# Patient Record
Sex: MALE | Race: BLACK OR AFRICAN AMERICAN | Employment: UNEMPLOYED | URBAN - METROPOLITAN AREA
[De-identification: names, ages, dates, MRNs, and addresses within clinical notes are randomized per-mention and may not be internally consistent; named-entity substitution may affect disease eponyms.]

---

## 2017-08-06 ENCOUNTER — HOSPITAL ENCOUNTER (EMERGENCY)
Age: 27
Discharge: HOME | End: 2017-08-06
Attending: STUDENT IN AN ORGANIZED HEALTH CARE EDUCATION/TRAINING PROGRAM
Payer: SELF-PAY

## 2017-08-06 VITALS
SYSTOLIC BLOOD PRESSURE: 132 MMHG | HEIGHT: 68 IN | RESPIRATION RATE: 16 BRPM | TEMPERATURE: 99.2 F | DIASTOLIC BLOOD PRESSURE: 77 MMHG | BODY MASS INDEX: 21.22 KG/M2 | OXYGEN SATURATION: 99 % | HEART RATE: 71 BPM | WEIGHT: 140 LBS

## 2017-08-06 DIAGNOSIS — Z20.2 STD EXPOSURE: Primary | ICD-10-CM

## 2017-08-06 LAB
APPEARANCE UR: ABNORMAL
BACTERIA URNS QL MICRO: ABNORMAL /HPF
BILIRUB UR QL: NEGATIVE
COLOR UR: ABNORMAL
EPITH CASTS URNS QL MICRO: ABNORMAL /LPF
GLUCOSE UR STRIP.AUTO-MCNC: NEGATIVE MG/DL
HGB UR QL STRIP: NEGATIVE
KETONES UR QL STRIP.AUTO: NEGATIVE MG/DL
LEUKOCYTE ESTERASE UR QL STRIP.AUTO: ABNORMAL
MUCOUS THREADS URNS QL MICRO: ABNORMAL /LPF
NITRITE UR QL STRIP.AUTO: NEGATIVE
PH UR STRIP: 6 [PH] (ref 5–8)
PROT UR STRIP-MCNC: ABNORMAL MG/DL
RBC #/AREA URNS HPF: ABNORMAL /HPF (ref 0–5)
SP GR UR REFRACTOMETRY: 1.02 (ref 1–1.03)
UROBILINOGEN UR QL STRIP.AUTO: 1 EU/DL (ref 0.2–1)
WBC URNS QL MICRO: ABNORMAL /HPF (ref 0–4)

## 2017-08-06 PROCEDURE — 87491 CHLMYD TRACH DNA AMP PROBE: CPT | Performed by: NURSE PRACTITIONER

## 2017-08-06 PROCEDURE — 74011000250 HC RX REV CODE- 250: Performed by: NURSE PRACTITIONER

## 2017-08-06 PROCEDURE — 99283 EMERGENCY DEPT VISIT LOW MDM: CPT

## 2017-08-06 PROCEDURE — 74011250637 HC RX REV CODE- 250/637: Performed by: NURSE PRACTITIONER

## 2017-08-06 PROCEDURE — 96372 THER/PROPH/DIAG INJ SC/IM: CPT

## 2017-08-06 PROCEDURE — 81001 URINALYSIS AUTO W/SCOPE: CPT | Performed by: NURSE PRACTITIONER

## 2017-08-06 PROCEDURE — 74011250636 HC RX REV CODE- 250/636: Performed by: NURSE PRACTITIONER

## 2017-08-06 RX ORDER — AZITHROMYCIN 250 MG/1
1000 TABLET, FILM COATED ORAL
Status: COMPLETED | OUTPATIENT
Start: 2017-08-06 | End: 2017-08-06

## 2017-08-06 RX ADMIN — AZITHROMYCIN 1000 MG: 250 TABLET, FILM COATED ORAL at 12:56

## 2017-08-06 RX ADMIN — LIDOCAINE HYDROCHLORIDE 250 MG: 10 INJECTION, SOLUTION EPIDURAL; INFILTRATION; INTRACAUDAL; PERINEURAL at 12:56

## 2017-08-06 NOTE — ED PROVIDER NOTES
HPI Comments: The pt is a 24year old male who presents ambulatory to the ED with request to be treated for chlamydia because his sexual partner tested positive on Friday. He denies penile pain and drainage. Pt denies fevers, chills, night sweats, chest pain, pressure, SOB, abdominal pain, n/v/d, melena, hematuria, dysuria, constipation, HA, dizziness, and syncope. History reviewed. No pertinent past medical history. History reviewed. No pertinent surgical history. Patient is a 24 y.o. male presenting with STD exposure. The history is provided by the patient. Exposure to STD   Pertinent negatives include no nausea, no vomiting and no abdominal pain. History reviewed. No pertinent past medical history. History reviewed. No pertinent surgical history. History reviewed. No pertinent family history. Social History     Social History    Marital status: SINGLE     Spouse name: N/A    Number of children: N/A    Years of education: N/A     Occupational History    Not on file. Social History Main Topics    Smoking status: Never Smoker    Smokeless tobacco: Never Used    Alcohol use No    Drug use: Not on file    Sexual activity: Not on file     Other Topics Concern    Not on file     Social History Narrative         ALLERGIES: Review of patient's allergies indicates no known allergies. Review of Systems   Constitutional: Negative for activity change, appetite change, chills, diaphoresis, fatigue, fever and unexpected weight change. HENT: Negative for congestion, ear pain, rhinorrhea, sinus pressure, sore throat, tinnitus, trouble swallowing and voice change. Eyes: Negative for pain, discharge, redness and visual disturbance. Respiratory: Negative for apnea, cough, choking, chest tightness, shortness of breath, wheezing and stridor. Cardiovascular: Negative for chest pain, palpitations and leg swelling.    Gastrointestinal: Negative for abdominal pain, constipation, nausea and vomiting. Endocrine: Negative for cold intolerance and heat intolerance. Genitourinary: Negative for difficulty urinating, dysuria, flank pain, hematuria, testicular pain and urgency. Musculoskeletal: Negative for arthralgias, back pain, gait problem, joint swelling, myalgias, neck pain and neck stiffness. Skin: Negative for color change, pallor, rash and wound. Allergic/Immunologic: Negative for immunocompromised state. Neurological: Negative for dizziness, tremors, syncope, weakness, light-headedness, numbness and headaches. Hematological: Does not bruise/bleed easily. Psychiatric/Behavioral: Negative for agitation, confusion and suicidal ideas. Vitals:    08/06/17 1211   BP: 132/77   Pulse: 71   Resp: 16   Temp: 99.2 °F (37.3 °C)   SpO2: 99%   Weight: 63.5 kg (140 lb)   Height: 5' 8\" (1.727 m)            Physical Exam   Constitutional: He is oriented to person, place, and time. He appears well-developed and well-nourished. No distress. HENT:   Head: Atraumatic. Nose: Nose normal.   Mouth/Throat: No oropharyngeal exudate. Eyes: Conjunctivae and EOM are normal. Right eye exhibits no discharge. Left eye exhibits no discharge. No scleral icterus. Neck: Normal range of motion. Neck supple. No JVD present. No tracheal deviation present. No thyromegaly present. Cardiovascular: Normal rate and regular rhythm. Exam reveals no gallop and no friction rub. No murmur heard. Pulmonary/Chest: Breath sounds normal. No stridor. No respiratory distress. He has no wheezes. He has no rales. He exhibits no tenderness. Abdominal: Soft. Bowel sounds are normal. He exhibits no distension and no mass. There is no tenderness. There is no rebound and no guarding. Musculoskeletal: Normal range of motion. He exhibits no edema or tenderness. Lymphadenopathy:     He has no cervical adenopathy. Neurological: He is alert and oriented to person, place, and time.  Coordination normal.   Skin: Skin is warm and dry. He is not diaphoretic. Psychiatric: He has a normal mood and affect. His behavior is normal.   Nursing note and vitals reviewed. MDM  Number of Diagnoses or Management Options  Diagnosis management comments:    * UA   * GC/chla    * rocephin and azithromycin        Amount and/or Complexity of Data Reviewed  Clinical lab tests: ordered and reviewed    Risk of Complications, Morbidity, and/or Mortality  General comments:    - stable, ambulatory pt in NAD    Patient Progress  Patient progress: stable    ED Course       Procedures      1:06 PM  Pt has been reevaluated. There are no new complaints, changes, or physical findings at this time. Medications have been reviewed w/ pt and/or family. Pt and/or family's questions have been answered. Pt and/or family expressed good understanding of the dx/tx/rx and is in agreement with plan of care. Pt instructed and agreed to f/u w/ PCP and to return to ED upon further deterioration. Pt is ready for discharge. LABORATORY TESTS:  No results found for this or any previous visit (from the past 12 hour(s)). IMAGING RESULTS:  No orders to display     No results found. MEDICATIONS GIVEN:  Medications   cefTRIAXone (ROCEPHIN) 250 mg in lidocaine (PF) (XYLOCAINE) 10 mg/mL (1 %) IM injection (250 mg IntraMUSCular Given 8/6/17 1256)   azithromycin (ZITHROMAX) tablet 1,000 mg (1,000 mg Oral Given 8/6/17 1256)       IMPRESSION:  1. STD exposure        PLAN:  1. There are no discharge medications for this patient. 2.   Follow-up Information     Follow up With Details Comments [address]  In 1 week  [address] [facility] [place] [facility]  As needed, If symptoms worsen [address]  [phone]        3.  Supportive care     Return to ED if worse         Radha Diaz NP   1:06 PM

## 2017-08-06 NOTE — DISCHARGE INSTRUCTIONS
Learning How to Use a Male Condom  What is a male condom? Condoms can be used to prevent pregnancy. They can also help protect against sexually transmitted infections (STIs). You must use a new condom every time you have sex. Condoms prevent pregnancy by keeping sperm and eggs apart. The condom holds the sperm so the sperm can't get into the vagina. A male condom is a tube of soft rubber or plastic with a closed end. It fits over the penis. There are many kinds of male condoms. Some condoms are lubricated. Some are ribbed. Most have a \"reservoir tip\" for holding the semen. You can also buy condoms of different sizes. How do you use a condom? Condoms work best if you follow these steps. · Use a new condom each time you have sex. · Check the condom's expiration date. Do not use it past that date. · When opening the condom wrapper, be sure not to poke a hole in the condom with your fingernails, teeth, or other sharp objects. · Put the condom on as soon as the penis is hard (erect) and before any sexual contact with your partner. ¨ First, hold the tip of the condom and squeeze out the air. This leaves room for the semen after you ejaculate. ¨ If you are not circumcised, pull down the loose skin from the head of the penis (foreskin) before you put on the condom. ¨ Hold on to the tip of the condom as you unroll the condom. Unroll it all the way down to the base of the penis. · After you ejaculate, hold on to the condom at the base of the penis, and withdraw from your partner while your penis is still erect. This will keep semen from spilling out of the condom. · Wash your hands after you handle a used condom. How do you buy and store condoms? · Male condoms may be available for free at family planning clinics. You can buy them without a prescription at drugstores, online, and in some grocery stores. · Keep condoms wrapped in their original packages until you are ready to use them.  Store them in a cool, dry place out of direct sunlight. · Don't keep rubber (latex) condoms in a glove compartment or other hot places for a long time. Heat weakens latex and increases the chance that the condom will break. · Don't use condoms in damaged packages. And don't use condoms that are brittle, sticky, or discolored, even if they are not past their expiration date. What else do you need to know? · To protect yourself and your partner from STIs, use a condom during vaginal, oral, or anal sex. · If the condom breaks or you think sperm may have leaked out into the vagina, the woman can use emergency contraception, such as the morning-after pill (Plan B). Emergency contraception can be used for up to 5 days after you have had sex. But it works best if you use it right away. · Use a male condom with another form of birth control. It's the best way to prevent pregnancy. ¨ You can use the condom with hormonal contraception, an intrauterine device (IUD), a diaphragm, a sponge, a shield, or a cervical cap. ¨ Don't use a male condom with a female condom. ¨ Use spermicide as its instructions say. Don't put spermicide inside a condom. · If you or your partner gets a rash or feels itchy after using a latex condom, talk to your doctor. You may have a latex allergy. Where can you learn more? Go to http://mouna-arnie.info/. Enter G521 in the search box to learn more about \"Learning How to Use a Male Condom. \"  Current as of: March 16, 2017  Content Version: 11.3  © 0208-5400 [employer]. Care instructions adapted under license by [employer] (which disclaims liability or warranty for this information). If you have questions about a medical condition or this instruction, always ask your healthcare professional. [address] any warranty or liability for your use of this information.          Exposure to Sexually Transmitted Infections: Care Instructions  Your Care Instructions  Sexually transmitted infections (STIs) are those diseases spread by sexual contact. There are at least 20 different STIs, including chlamydia, gonorrhea, syphilis, and human immunodeficiency virus (HIV), which causes AIDS. Bacteria-caused STIs can be treated and cured. STIs caused by viruses, such as HIV, can be treated but not cured. Some STIs can reduce a woman's chances of getting pregnant in the future. STIs are spread during sexual contact, such as vaginal intercourse and oral or anal sex. Follow-up care is a key part of your treatment and safety. Be sure to make and go to all appointments, and call your doctor if you are having problems. Its also a good idea to know your test results and keep a list of the medicines you take. How can you care for yourself at home? · Your doctor may have given you a shot of antibiotics. If your doctor prescribed antibiotic pills, take them as directed. Do not stop taking them just because you feel better. You need to take the full course of antibiotics. · Do not have sexual contact while you have symptoms of an STI or are being treated for an STI. · Tell your sex partner (or partners) that he or she will need treatment. · If you are a woman, do not douche. Douching changes the normal balance of bacteria in the vagina and may spread an infection up into your reproductive organs. To prevent exposure to STIs in the future  · Use latex condoms every time you have sex. Use them from the beginning to the end of sexual contact. · Talk to your partner before you have sex. Find out if he or she has or is at risk for any STI. Keep in mind that a person may be able to spread an STI even if he or she does not have symptoms. · Do not have sex if you are being treated for an STI. · Do not have sex with anyone who has symptoms of an STI, such as sores on the genitals or mouth.   · Having one sex partner (who does not have STIs and does not have sex with anyone else) is a good way to avoid STIs. When should you call for help? Call your doctor now or seek immediate medical care if:  · You have new pain in your belly or pelvis. · You have symptoms of a urinary tract infection. These may include:  ¨ Pain or burning when you urinate. ¨ A frequent need to urinate without being able to pass much urine. ¨ Pain in the flank, which is just below the rib cage and above the waist on either side of the back. ¨ Blood in your urine. ¨ A fever. · You have new or worsening pain or swelling in the scrotum. Watch closely for changes in your health, and be sure to contact your doctor if:  · You have unusual vaginal bleeding. · You have a discharge from the vagina or penis. · You have any new symptoms, such as sores, bumps, rashes, blisters, or warts. · You have itching, tingling, pain, or burning in the genital or anal area. · You think you may have an STI. Where can you learn more? Go to http://mouna-arnie.info/. Enter S472 in the search box to learn more about \"Exposure to Sexually Transmitted Infections: Care Instructions. \"  Current as of: March 20, 2017  Content Version: 11.3  © 8262-4282 [employer]. Care instructions adapted under license by [employer] (which disclaims liability or warranty for this information). If you have questions about a medical condition or this instruction, always ask your healthcare professional. [place] [address] any warranty or liability for your use of this information. We hope that we have addressed all of your medical concerns. The examination and treatment you received in the Emergency Department were for an emergent problem and were not intended as complete care. It is important that you follow up with your healthcare provider(s) for ongoing care.  If your symptoms worsen or do not improve as expected, and you are unable to reach your usual health care provider(s), you should return to the Emergency Department. Today's healthcare is undergoing tremendous change, and patient satisfaction surveys are one of the many tools to assess the quality of medical care. You may receive a survey from the [employer] regarding your experience in the Emergency Department. I hope that your experience has been completely positive, particularly the medical care that I provided. As such, please participate in the survey; anything less than excellent does not meet my expectations or intentions. [facility] and [facility] participate in nationally recognized quality of care measures. If your blood pressure is greater than 120/80, as reported below, we urge that you seek medical care to address the potential of high blood pressure, commonly known as hypertension. Hypertension can be hereditary or can be caused by certain medical conditions, pain, stress, or \"white coat syndrome. \"       Please make an appointment with your health care provider(s) for follow up of your Emergency Department visit. VITALS:   Patient Vitals for the past 8 hrs:   Temp Pulse Resp BP SpO2   08/06/17 1211 99.2 °F (37.3 °C) 71 16 132/77 99 %          Thank you for allowing us to provide you with medical care today. We realize that you have many choices for your emergency care needs. Please choose us in the future for any continued health care needs. Angela Sterling, [address] Batista: [phone]            No results found for this or any previous visit (from the past 24 hour(s)). No results found.

## 2017-08-06 NOTE — ED TRIAGE NOTES
Pt states that his sexual partner was recently diagnosed with chlamydia and he states that he is not having any symptoms, but wants to get checked.

## 2017-08-07 LAB
C TRACH DNA SPEC QL NAA+PROBE: POSITIVE
N GONORRHOEA DNA SPEC QL NAA+PROBE: NEGATIVE
SAMPLE TYPE: ABNORMAL
SERVICE CMNT-IMP: ABNORMAL
SPECIMEN SOURCE: ABNORMAL

## 2017-08-08 NOTE — PROGRESS NOTES
Pt called back regarding results. Explained + result. Pt already treated.   CHELSEA Mcleod  12:34 PM

## 2018-01-18 ENCOUNTER — HOSPITAL ENCOUNTER (EMERGENCY)
Age: 28
Discharge: HOME | End: 2018-01-18
Attending: EMERGENCY MEDICINE
Payer: SELF-PAY

## 2018-01-18 VITALS
OXYGEN SATURATION: 98 % | TEMPERATURE: 98.2 F | HEART RATE: 73 BPM | HEIGHT: 68 IN | WEIGHT: 151 LBS | SYSTOLIC BLOOD PRESSURE: 122 MMHG | RESPIRATION RATE: 16 BRPM | DIASTOLIC BLOOD PRESSURE: 75 MMHG | BODY MASS INDEX: 22.88 KG/M2

## 2018-01-18 DIAGNOSIS — N34.2 URETHRITIS: Primary | ICD-10-CM

## 2018-01-18 LAB
APPEARANCE UR: ABNORMAL
BACTERIA URNS QL MICRO: NEGATIVE /HPF
BILIRUB UR QL: NEGATIVE
COLOR UR: ABNORMAL
EPITH CASTS URNS QL MICRO: ABNORMAL /LPF
GLUCOSE UR STRIP.AUTO-MCNC: NEGATIVE MG/DL
HGB UR QL STRIP: ABNORMAL
HYALINE CASTS URNS QL MICRO: ABNORMAL /LPF (ref 0–5)
KETONES UR QL STRIP.AUTO: NEGATIVE MG/DL
LEUKOCYTE ESTERASE UR QL STRIP.AUTO: ABNORMAL
NITRITE UR QL STRIP.AUTO: NEGATIVE
PH UR STRIP: 6.5 [PH] (ref 5–8)
PROT UR STRIP-MCNC: NEGATIVE MG/DL
RBC #/AREA URNS HPF: ABNORMAL /HPF (ref 0–5)
SP GR UR REFRACTOMETRY: 1.03 (ref 1–1.03)
UR CULT HOLD, URHOLD: NORMAL
UROBILINOGEN UR QL STRIP.AUTO: 0.2 EU/DL (ref 0.2–1)
WBC URNS QL MICRO: >100 /HPF (ref 0–4)

## 2018-01-18 PROCEDURE — 74011250636 HC RX REV CODE- 250/636: Performed by: EMERGENCY MEDICINE

## 2018-01-18 PROCEDURE — 87491 CHLMYD TRACH DNA AMP PROBE: CPT | Performed by: EMERGENCY MEDICINE

## 2018-01-18 PROCEDURE — 81001 URINALYSIS AUTO W/SCOPE: CPT | Performed by: EMERGENCY MEDICINE

## 2018-01-18 PROCEDURE — 99283 EMERGENCY DEPT VISIT LOW MDM: CPT

## 2018-01-18 PROCEDURE — 74011000250 HC RX REV CODE- 250: Performed by: EMERGENCY MEDICINE

## 2018-01-18 PROCEDURE — 96372 THER/PROPH/DIAG INJ SC/IM: CPT

## 2018-01-18 PROCEDURE — 74011250637 HC RX REV CODE- 250/637: Performed by: EMERGENCY MEDICINE

## 2018-01-18 RX ORDER — CEPHALEXIN 500 MG/1
500 CAPSULE ORAL 2 TIMES DAILY
Qty: 14 CAP | Refills: 0 | Status: SHIPPED | OUTPATIENT
Start: 2018-01-18 | End: 2018-01-25

## 2018-01-18 RX ORDER — AZITHROMYCIN 250 MG/1
1000 TABLET, FILM COATED ORAL
Status: COMPLETED | OUTPATIENT
Start: 2018-01-18 | End: 2018-01-18

## 2018-01-18 RX ADMIN — AZITHROMYCIN 1000 MG: 250 TABLET, FILM COATED ORAL at 20:31

## 2018-01-18 RX ADMIN — LIDOCAINE HYDROCHLORIDE 250 MG: 10 INJECTION, SOLUTION EPIDURAL; INFILTRATION; INTRACAUDAL; PERINEURAL at 20:30

## 2018-01-18 NOTE — Clinical Note
YOU HAVE TESTED FOR GONORRHEA AND CHLAMYDIA FOR TONIGHT. YOU WILL BE CONTACTED IF RESULTS ARE POSITIVE. YOU HAVE BEEN TREATED TONIGHT. PLEASE FILL AND TAKE KEFLEX:1 PILL EVERY 12 HOURS FOR 7 DAYS. FOLLOW-UP WITH HEALTH DEPARTMENT FOR FURTHER TESTING.

## 2018-01-19 LAB
C TRACH DNA SPEC QL NAA+PROBE: NEGATIVE
N GONORRHOEA DNA SPEC QL NAA+PROBE: POSITIVE
SAMPLE TYPE: ABNORMAL
SERVICE CMNT-IMP: ABNORMAL
SPECIMEN SOURCE: ABNORMAL

## 2018-01-19 NOTE — ED PROVIDER NOTES
HPI Comments: 44-year-old male presents to the emergency room for evaluation of burning with urination and penile discharge. This has been ongoing for the past 2-3 days. Patient denies any frequency or urgency. No noted blood in urine. No back pain or abdominal pain. No nausea or vomiting. No fevers or chills. No sores or lesions. No medicines taken prior to arrival. Patient has had unprotected intercourse. Social hx  Nonsmoker  No alcohol    Patient is a 24 y.o. male presenting with penile problem. The history is provided by the patient. Penis Injury   Primary symptoms include dysuria. Pertinent negatives include no penile pain. Pertinent negatives include no nausea, no vomiting, no abdominal pain and no frequency. History reviewed. No pertinent past medical history. History reviewed. No pertinent surgical history. History reviewed. No pertinent family history. Social History     Social History    Marital status: SINGLE     Spouse name: N/A    Number of children: N/A    Years of education: N/A     Occupational History    Not on file. Social History Main Topics    Smoking status: Never Smoker    Smokeless tobacco: Never Used    Alcohol use No    Drug use: No    Sexual activity: Not on file     Other Topics Concern    Not on file     Social History Narrative         ALLERGIES: Review of patient's allergies indicates no known allergies. Review of Systems   Constitutional: Negative for chills and fever. Respiratory: Negative for chest tightness, shortness of breath and wheezing. Cardiovascular: Negative for chest pain and palpitations. Gastrointestinal: Negative for abdominal pain, nausea and vomiting. Genitourinary: Positive for discharge and dysuria. Negative for difficulty urinating, frequency, hematuria, penile pain, penile swelling, scrotal swelling, testicular pain and urgency.    Musculoskeletal: Negative for arthralgias, back pain, myalgias, neck pain and neck stiffness. Skin: Negative for color change, rash and wound. Neurological: Negative for dizziness and headaches. All other systems reviewed and are negative. Vitals:    01/18/18 1906   BP: 122/75   Pulse: 73   Resp: 16   Temp: 98.2 °F (36.8 °C)   SpO2: 98%   Weight: 68.5 kg (151 lb)   Height: 5' 8\" (1.727 m)            Physical Exam   Constitutional: He is oriented to person, place, and time. He appears well-developed and well-nourished. No distress. HENT:   Head: Normocephalic and atraumatic. Right Ear: External ear normal.   Left Ear: External ear normal.   Eyes: Conjunctivae and EOM are normal. Pupils are equal, round, and reactive to light. Neck: Normal range of motion. Neck supple. Cardiovascular: Normal rate and regular rhythm. Pulmonary/Chest: Effort normal and breath sounds normal. No respiratory distress. He has no wheezes. Abdominal: Soft. Normal appearance and bowel sounds are normal. He exhibits no shifting dullness, no distension, no pulsatile liver, no abdominal bruit, no pulsatile midline mass and no mass. There is no hepatosplenomegaly, splenomegaly or hepatomegaly. There is no tenderness. There is no rigidity, no rebound, no guarding, no CVA tenderness, no tenderness at McBurney's point and negative Gallego's sign. Hernia confirmed negative in the right inguinal area and confirmed negative in the left inguinal area. Genitourinary: Testes normal. Right testis shows no mass, no swelling and no tenderness. Right testis is descended. Cremasteric reflex is not absent on the right side. Left testis shows no mass, no swelling and no tenderness. Left testis is descended. Cremasteric reflex is not absent on the left side. Circumcised. No penile tenderness. No discharge found. Genitourinary Comments: No open sores or lesions. No noted discharge. No testicular swelling. No scrotal swelling   Musculoskeletal: Normal range of motion. He exhibits no edema or tenderness. Lymphadenopathy:        Right: No inguinal adenopathy present. Left: No inguinal adenopathy present. Neurological: He is alert and oriented to person, place, and time. He has normal reflexes. No cranial nerve deficit. Coordination normal.   Skin: Skin is warm and dry. No rash noted. No erythema. Psychiatric: He has a normal mood and affect. His behavior is normal. Judgment and thought content normal.   Nursing note and vitals reviewed. MDM  Number of Diagnoses or Management Options  Urethritis:   Diagnosis management comments: 37 yo male presenting for  burning with urination and discharge. No noted discharge on exam. No sores lesions. No testicular swelling or scrotal swelling. Patient is afebrile. Plan: Urinalysis, gonorrhea and Chlamydia. 8:25 PM  Urinalysis suggestive of UTI. Patient is concerned for STD due to burning he urinates and discharge. Patient will be treated prophylactically with Rocephin and Zithromax pending gonorrhea and chlamydia results. Will discharge home with Keflex pending urine culture. Patient to followup with health Department for further evaluation    Patient's results have been reviewed with them. Patient and/or family have verbally conveyed their understanding and agreement of the patient's signs, symptoms, diagnosis, treatment and prognosis and additionally agree to follow up as recommended or return to the Emergency Room should their condition change prior to follow-up. Discharge instructions have also been provided to the patient with some educational information regarding their diagnosis as well a list of reasons why they would want to return to the ER prior to their follow-up appointment should their condition change.                Amount and/or Complexity of Data Reviewed  Discuss the patient with other providers: yes (ER attending-Larry)    Patient Progress  Patient progress: stable    ED Course       Procedures      Pt case including HPI, PE, and all available lab and radiology results has been discussed with attending physician. Opportunity to evaluate patient has been provided to ER attending. Discharge and prescription plan has been agreed upon.

## 2018-01-19 NOTE — ED TRIAGE NOTES
Triage note: Pt states he has penile pain when he goes to the [facility]. Pt has had unprotected sex.  +discharge.

## 2019-06-04 ENCOUNTER — HOSPITAL ENCOUNTER (EMERGENCY)
Age: 29
Discharge: HOME | End: 2019-06-04
Attending: EMERGENCY MEDICINE
Payer: SELF-PAY

## 2019-06-04 ENCOUNTER — HOSPITAL ENCOUNTER (EMERGENCY)
Age: 29
Discharge: HOME | End: 2019-06-05
Attending: EMERGENCY MEDICINE
Payer: SELF-PAY

## 2019-06-04 ENCOUNTER — APPOINTMENT (OUTPATIENT)
Dept: CT IMAGING | Age: 29
End: 2019-06-04
Attending: EMERGENCY MEDICINE
Payer: SELF-PAY

## 2019-06-04 LAB — CREAT BLD-MCNC: 0.8 MG/DL (ref 0.6–1.3)

## 2019-06-04 PROCEDURE — 70498 CT ANGIOGRAPHY NECK: CPT

## 2019-06-04 PROCEDURE — 74011636320 HC RX REV CODE- 636/320: Performed by: RADIOLOGY

## 2019-06-04 PROCEDURE — 99283 EMERGENCY DEPT VISIT LOW MDM: CPT

## 2019-06-04 PROCEDURE — 82565 ASSAY OF CREATININE: CPT

## 2019-06-04 PROCEDURE — 75810000275 HC EMERGENCY DEPT VISIT NO LEVEL OF CARE

## 2019-06-04 RX ORDER — SODIUM CHLORIDE 0.9 % (FLUSH) 0.9 %
SYRINGE (ML) INJECTION
Status: DISPENSED
Start: 2019-06-04 | End: 2019-06-04

## 2019-06-04 RX ORDER — CYCLOBENZAPRINE HCL 10 MG
TABLET ORAL
Status: DISPENSED
Start: 2019-06-04 | End: 2019-06-04

## 2019-06-04 RX ORDER — SODIUM CHLORIDE 0.9 % (FLUSH) 0.9 %
5-10 SYRINGE (ML) INJECTION
Status: COMPLETED | OUTPATIENT
Start: 2019-06-04 | End: 2019-06-04

## 2019-06-04 RX ORDER — LIDOCAINE 4 G/100G
PATCH TOPICAL
Status: DISPENSED
Start: 2019-06-04 | End: 2019-06-04

## 2019-06-04 RX ADMIN — IOPAMIDOL 100 ML: 755 INJECTION, SOLUTION INTRAVENOUS at 12:11

## 2019-06-04 RX ADMIN — Medication 10 ML: at 12:12

## 2019-06-04 NOTE — ED PROVIDER NOTES
The documentation for this period is being entered following the guidelines as defined in the [facility] policy by Christie Bliss MD.

## 2019-06-04 NOTE — ED NOTES
Chart created after patient evaluated, treated and dispositioned on Epic downtime. Chart created per radiology supervisor Hany Rivero) request to synchronize order with exam.     The documentation for this period is being entered following the guidelines as defined in the [facility] policy by Patricia Ventura RN.

## 2019-06-06 ENCOUNTER — HOSPITAL ENCOUNTER (EMERGENCY)
Age: 29
Discharge: STILL ADMITTED OR ADMINISTRATIVE | End: 2019-06-06
Attending: EMERGENCY MEDICINE

## 2021-08-17 ENCOUNTER — HOSPITAL ENCOUNTER (EMERGENCY)
Age: 31
Discharge: HOME | End: 2021-08-18
Attending: EMERGENCY MEDICINE
Payer: COMMERCIAL

## 2021-08-17 VITALS
HEIGHT: 69 IN | WEIGHT: 153 LBS | TEMPERATURE: 98.8 F | RESPIRATION RATE: 16 BRPM | HEART RATE: 70 BPM | OXYGEN SATURATION: 98 % | DIASTOLIC BLOOD PRESSURE: 68 MMHG | BODY MASS INDEX: 22.66 KG/M2 | SYSTOLIC BLOOD PRESSURE: 118 MMHG

## 2021-08-17 DIAGNOSIS — S46.912A STRAIN OF LEFT SHOULDER, INITIAL ENCOUNTER: Primary | ICD-10-CM

## 2021-08-17 PROCEDURE — 99283 EMERGENCY DEPT VISIT LOW MDM: CPT

## 2021-08-17 PROCEDURE — 74011250637 HC RX REV CODE- 250/637: Performed by: EMERGENCY MEDICINE

## 2021-08-17 RX ORDER — IBUPROFEN 600 MG/1
600 TABLET ORAL
Qty: 30 TABLET | Refills: 0 | Status: SHIPPED | OUTPATIENT
Start: 2021-08-17 | End: 2021-11-15 | Stop reason: SDUPTHER

## 2021-08-17 RX ORDER — IBUPROFEN 600 MG/1
600 TABLET ORAL
Status: COMPLETED | OUTPATIENT
Start: 2021-08-17 | End: 2021-08-17

## 2021-08-17 RX ADMIN — IBUPROFEN 600 MG: 600 TABLET, FILM COATED ORAL at 23:38

## 2021-08-17 NOTE — LETTER
[facility] EMERGENCY DEPT  [address] [facility] [id number] [phone]    Work/School Note    Date: 8/17/2021    To Whom It May concern:    Eduardo Cedeno was seen and treated today in the emergency room by the following provider(s):  Attending Provider: Jenaro Ellsworth MD.      Eduardo Cedeno may return to work on 8/20/2021.     Sincerely,          DAWIT KochN, RN

## 2021-08-17 NOTE — LETTER
[address] EMERGENCY DEPT  [address] [facility] [id number] [phone]    Work/School Note    Date: 8/17/2021    To Whom It May concern:    Greg Monique was seen and treated today in the emergency room by the following provider(s):  Attending Provider: Sabiha Broussard MD.      Greg Monique may return to work on 8/19/21 but no lifting anything over 5lb for 1 week.     Sincerely,          Batsheva Saenz MD

## 2021-08-18 NOTE — ED PROVIDER NOTES
EMERGENCY DEPARTMENT HISTORY AND PHYSICAL EXAM      Date: 8/17/2021  Patient Name: Breanna Dorman  Patient Age and Sex: 36 y.o. male     History of Presenting Illness     Chief Complaint   Patient presents with    Shoulder Pain       History Provided By: Patient    HPI: Breanna Dorman  Is a 37yoM with left shoulder pain. Pt states for past 5d has been having left shoulder pain. Worse with movement especially when he lifts his left arm up. No trauma such as falls or hits. He does work on the docks lifting heavy materials repetitiously. No fevers, neck pain, numbness or weakness of the arm. There are no other complaints, changes, or physical findings at this time. PCP: None    No current facility-administered medications on file prior to encounter. No current outpatient medications on file prior to encounter. Past History     Past Medical History:  No past medical history on file. Past Surgical History:  No past surgical history on file. Family History:  No family history on file. Social History:  Social History     Tobacco Use    Smoking status: Never Smoker    Smokeless tobacco: Never Used   Substance Use Topics    Alcohol use: No    Drug use: No       Allergies:  No Known Allergies      Review of Systems   Review of Systems   Constitutional: Negative for chills and fever. Respiratory: Negative for cough and shortness of breath. Cardiovascular: Negative for chest pain. Gastrointestinal: Negative for abdominal pain, constipation, diarrhea, nausea and vomiting. Genitourinary: Negative for dysuria, frequency and hematuria. Musculoskeletal: Positive for arthralgias. Negative for joint swelling. Neurological: Negative for weakness and numbness. All other systems reviewed and are negative. Physical Exam   Physical Exam  Constitutional:       General: He is not in acute distress. Appearance: He is well-developed. HENT:      Head: Normocephalic and atraumatic. Nose: Nose normal.      Mouth/Throat:      Mouth: Mucous membranes are moist.   Eyes:      Extraocular Movements: Extraocular movements intact. Conjunctiva/sclera: Conjunctivae normal.   Cardiovascular:      Comments: Well perfused  Pulmonary:      Effort: Pulmonary effort is normal. No respiratory distress. Musculoskeletal:         General: Normal range of motion. Cervical back: Normal range of motion. Comments: TTP over the glenohumeral area and deltoid muscle. Pain with abduction but can do it completely. No infection or trauma noted. No TTP over cervical spine. Neurological:      General: No focal deficit present. Mental Status: He is alert and oriented to person, place, and time. Psychiatric:         Mood and Affect: Mood normal.          Diagnostic Study Results     Labs -   No results found for this or any previous visit (from the past 12 hour(s)). Radiologic Studies -   No orders to display     CT Results  (Last 48 hours)    None        CXR Results  (Last 48 hours)    None            Medical Decision Making   I am the first provider for this patient. I reviewed the vital signs, available nursing notes, past medical history, past surgical history, family history and social history. Vital Signs-Reviewed the patient's vital signs. Patient Vitals for the past 12 hrs:   Temp Pulse Resp BP SpO2   08/17/21 2320 98.8 °F (37.1 °C) 70 16 118/68 98 %       Records Reviewed: Nursing Notes and Old Medical Records    Provider Notes (Medical Decision Making):   Patient with left shoulder pain. Most likely strain or sprain. No indication for xrays as no concern for fx or arthritis. ED Course:   Initial assessment performed. The patients presenting problems have been discussed, and they are in agreement with the care plan formulated and outlined with them. I have encouraged them to ask questions as they arise throughout their visit.        Critical Care Time: 0    Disposition:  Discharge Note:  The patient has been re-evaluated and is ready for discharge. Reviewed available results with patient. Counseled patient on diagnosis and care plan. Patient has expressed understanding, and all questions have been answered. Patient agrees with plan and agrees to follow up as recommended, or to return to the ED if their symptoms worsen. Discharge instructions have been provided and explained to the patient, along with reasons to return to the ED. PLAN:  Current Discharge Medication List      START taking these medications    Details   ibuprofen (MOTRIN) 600 mg tablet Take 1 Tablet by mouth every eight (8) hours as needed for Pain. Qty: 30 Tablet, Refills: 0  Start date: 8/17/2021         1.   2.   Follow-up Information    None       3. Return to ED if worse     Diagnosis     Clinical Impression:   1. Strain of left shoulder, initial encounter        Attestations:    Pura Pelaez M.D. Please note that this dictation was completed with [employer], the computer voice recognition software. Quite often unanticipated grammatical, syntax, homophones, and other interpretive errors are inadvertently transcribed by the computer software. Please disregard these errors. Please excuse any errors that have escaped final proofreading. Thank you.

## 2021-08-18 NOTE — ED TRIAGE NOTES
Pt to ED today for L shoulder pain since Friday. Pt denies injury. Pt reports taking 2 Tylenol tablets this AM, unsure exact dosage. 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 \" Pt denies numbness or tingling to the effected extremity.

## 2021-08-18 NOTE — ED NOTES
Patient (s) given copy of dc instructions and 1 script(s and 1 work note to return on 8/20/2021. Patient (s) verbalized understanding of instructions and script (s). Patient given a current medication reconciliation form and verbalized understanding of their medications. Patient (s) verbalized understanding of the importance of discussing medications with  his or her physician or clinic they will be following up with. Patient alert and oriented and in no acute distress. Patient discharged home ambulatory.

## 2021-08-18 NOTE — ED NOTES
Emergency Department Nursing Plan of Care       The Nursing Plan of Care is developed from the Nursing assessment and Emergency Department Attending provider initial evaluation. The plan of care may be reviewed in the ED Provider note.     The Plan of Care was developed with the following considerations:   Patient / Family readiness to learn indicated by:verbalized understanding  Persons(s) to be included in education: patient  Barriers to Learning/Limitations:No    Signed     Migel Ann    8/18/2021   12:10 AM

## 2021-11-15 ENCOUNTER — HOSPITAL ENCOUNTER (EMERGENCY)
Age: 31
Discharge: HOME | End: 2021-11-15
Attending: EMERGENCY MEDICINE
Payer: COMMERCIAL

## 2021-11-15 VITALS
TEMPERATURE: 98.4 F | DIASTOLIC BLOOD PRESSURE: 83 MMHG | WEIGHT: 153 LBS | SYSTOLIC BLOOD PRESSURE: 123 MMHG | HEIGHT: 69 IN | RESPIRATION RATE: 18 BRPM | BODY MASS INDEX: 22.66 KG/M2 | HEART RATE: 73 BPM | OXYGEN SATURATION: 98 %

## 2021-11-15 DIAGNOSIS — H65.91 RIGHT OTITIS MEDIA WITH EFFUSION: ICD-10-CM

## 2021-11-15 DIAGNOSIS — J06.9 ACUTE URI: Primary | ICD-10-CM

## 2021-11-15 PROCEDURE — 99282 EMERGENCY DEPT VISIT SF MDM: CPT

## 2021-11-15 RX ORDER — LORATADINE AND PSEUDOEPHEDRINE SULFATE 10; 240 MG/1; MG/1
1 TABLET, EXTENDED RELEASE ORAL DAILY
Qty: 14 TABLET | Refills: 0 | Status: SHIPPED | OUTPATIENT
Start: 2021-11-15

## 2021-11-15 RX ORDER — IBUPROFEN 600 MG/1
600 TABLET ORAL
Qty: 30 TABLET | Refills: 0 | Status: SHIPPED | OUTPATIENT
Start: 2021-11-15

## 2021-11-15 NOTE — Clinical Note
[facility] EMERGENCY DEPT  [address] [address] [phone]    Work/School Note    Date: 11/15/2021    To Whom It May concern:      Renee Hudson was seen and treated today in the emergency room by the following provider(s):  Attending Provider: Akbar Ureña DO  Nurse Practitioner: Seamus Kinney NP. Renee Hudson is excused from work/school on 11/15/21. He is clear to return to work/school on 11/16/21.         Sincerely,          Jennifer Nicolas NP

## 2021-11-16 NOTE — DISCHARGE INSTRUCTIONS
It was a pleasure taking care of you at [facility] Emergency Department today. We know that when you come to [facility], you are entrusting us with your health, comfort, and safety. Our physicians and nurses honor that trust, and we truly appreciate the opportunity to care for you and your loved ones. We also value our feedback. If you receive a survey about your Emergency Department experience today, please fill it out. We care about our patients' feedback, and we listen to what you have to say. Thank you!

## 2021-11-16 NOTE — ED PROVIDER NOTES
EMERGENCY DEPARTMENT HISTORY AND PHYSICAL EXAM    Date: 11/15/2021  Patient Name: Brett Sidhu    History of Presenting Illness     Chief Complaint   Patient presents with    Sore Throat    Ear Pain         History Provided By: Patient    HPI: Brett Sidhu is a 36 y.o. male with a PMH of No significant past medical history who presents with sore throat and ear pain. Onset this morning. Located to the bilateral ear. Denies fever, chills, ear drainage. Reports sore throat. Denies painful swallowing or difficulty to ear. Associated with nasal drainage and congestion. He has not taken anything for his sx. Reports exposure to sick contacts 2 weeks ago and took a COVID test that is neg. Denies hx of seasonal allergies. PCP: None    Current Outpatient Medications   Medication Sig Dispense Refill    loratadine-pseudoephedrine (Claritin-D 24 Hour)  mg per tablet Take 1 Tablet by mouth daily. 14 Tablet 0    ibuprofen (MOTRIN) 600 mg tablet Take 1 Tablet by mouth every eight (8) hours as needed for Pain. 30 Tablet 0       Past History     Past Medical History:  History reviewed. No pertinent past medical history. Past Surgical History:  History reviewed. No pertinent surgical history. Family History:  History reviewed. No pertinent family history. Social History:  Social History     Tobacco Use    Smoking status: Never Smoker    Smokeless tobacco: Never Used   Substance Use Topics    Alcohol use: Yes    Drug use: No       Allergies:  No Known Allergies      Review of Systems   Review of Systems   Constitutional: Negative for chills and fever. HENT: Positive for congestion, ear pain, rhinorrhea and sore throat. Negative for ear discharge, postnasal drip, sinus pain, trouble swallowing and voice change. Respiratory: Negative for cough, shortness of breath and wheezing. Cardiovascular: Negative for chest pain. Gastrointestinal: Negative for abdominal pain, nausea and vomiting. Musculoskeletal: Positive for neck pain. Negative for back pain. Neurological: Negative for headaches. All other systems reviewed and are negative. Physical Exam     Vitals:    11/15/21 2155   BP: 123/83   Pulse: 73   Resp: 18   Temp: 98.4 °F (36.9 °C)   SpO2: 98%   Weight: 69.4 kg (153 lb)   Height: 5' 9\" (1.753 m)     Physical Exam  Vitals and nursing note reviewed. Constitutional:       General: He is not in acute distress. Appearance: He is well-developed. He is not ill-appearing. HENT:      Head: Normocephalic and atraumatic. Right Ear: External ear normal. A middle ear effusion is present. Tympanic membrane is not erythematous, retracted or bulging. Left Ear: Tympanic membrane, ear canal and external ear normal.      Nose: Nose normal.      Mouth/Throat:      Mouth: Mucous membranes are moist.      Pharynx: Oropharynx is clear. Uvula midline. No pharyngeal swelling, oropharyngeal exudate or posterior oropharyngeal erythema. Comments: Postnasal drainage noted   Eyes:      Extraocular Movements: Extraocular movements intact. Conjunctiva/sclera: Conjunctivae normal.      Pupils: Pupils are equal, round, and reactive to light. Cardiovascular:      Rate and Rhythm: Normal rate and regular rhythm. Pulses: Normal pulses. Heart sounds: Normal heart sounds. Pulmonary:      Effort: Pulmonary effort is normal.      Breath sounds: Normal breath sounds. Musculoskeletal:      Cervical back: Normal range of motion and neck supple. Lymphadenopathy:      Cervical: No cervical adenopathy. Skin:     General: Skin is warm and dry. Neurological:      Mental Status: He is alert and oriented to person, place, and time. GCS: GCS eye subscore is 4. GCS verbal subscore is 5. GCS motor subscore is 6. Cranial Nerves: No cranial nerve deficit. Psychiatric:         Thought Content:  Thought content normal.           Diagnostic Study Results     Labs -   No results found for this or any previous visit (from the past 12 hour(s)). Radiologic Studies -   No orders to display     CT Results  (Last 48 hours)    None        CXR Results  (Last 48 hours)    None            Medical Decision Making   I am the first provider for this patient. I reviewed the vital signs, available nursing notes, past medical history, past surgical history, family history and social history. Vital Signs-Reviewed the patient's vital signs. Records Reviewed: Nursing Notes and Old Medical Records    Provider Notes (Medical Decision Making):     DDX: Viral vs. Strep Pharyngitis, Tonsillitis, Seasonal Allergies,  Postnasal Drip, URI, AOM, OME, COVID,           Disposition:  Discharge   DISCHARGE NOTE:         Care plan outlined and precautions discussed. Patient has no new complaints, changes, or physical findings. All of pt's questions and concerns were addressed. Patient was instructed and agrees to follow up with PCP, as well as to return to the ED upon further deterioration. Patient is ready to go home. Follow-up Information     Follow up With Specialties Details Why [address]  Call  As needed, If symptoms worsen [address] [facility] [place], [address] [place] [address] Drive  [phone]          Current Discharge Medication List      START taking these medications    Details   loratadine-pseudoephedrine (Claritin-D 24 Hour)  mg per tablet Take 1 Tablet by mouth daily. Qty: 14 Tablet, Refills: 0  Start date: 11/15/2021         CONTINUE these medications which have CHANGED    Details   ibuprofen (MOTRIN) 600 mg tablet Take 1 Tablet by mouth every eight (8) hours as needed for Pain. Qty: 30 Tablet, Refills: 0  Start date: 11/15/2021             Procedures:  Procedures    Please note that this dictation was completed with Dragon, computer voice recognition software.   Quite often unanticipated grammatical, syntax, homophones, and other interpretive errors are inadvertently transcribed by the computer software. Please disregard these errors. Additionally, please excuse any errors that have escaped final proofreading. Diagnosis     Clinical Impression:   1. Acute URI    2.  Right otitis media with effusion

## 2022-06-28 ENCOUNTER — TELEPHONE (OUTPATIENT)
Dept: FAMILY MEDICINE CLINIC | Age: 32
End: 2022-06-28

## 2022-06-28 NOTE — TELEPHONE ENCOUNTER
Employer called in reference to this person stating that he had received a return to work letter from [employer], advised patient is not a patient at this practice. Wanted to speak to Roya, advised she was out of office today.

## 2023-01-24 ENCOUNTER — TELEPHONE (OUTPATIENT)
Dept: ORTHOPEDIC SURGERY | Age: 33
End: 2023-01-24

## 2023-01-24 NOTE — TELEPHONE ENCOUNTER
LVM for patient to call back in regards to the referral received from [facility] for his left hand fracture.

## 2023-02-10 DIAGNOSIS — M79.642 PAIN OF LEFT HAND: Primary | ICD-10-CM

## 2023-02-22 ENCOUNTER — OFFICE VISIT (OUTPATIENT)
Dept: ORTHOPEDIC SURGERY | Age: 33
End: 2023-02-22
Payer: MEDICAID

## 2023-02-22 ENCOUNTER — TRANSCRIBE ORDER (OUTPATIENT)
Dept: REGISTRATION | Age: 33
End: 2023-02-22

## 2023-02-22 ENCOUNTER — HOSPITAL ENCOUNTER (OUTPATIENT)
Dept: GENERAL RADIOLOGY | Age: 33
Discharge: HOME | End: 2023-02-22
Payer: MEDICAID

## 2023-02-22 VITALS
DIASTOLIC BLOOD PRESSURE: 73 MMHG | HEIGHT: 69 IN | SYSTOLIC BLOOD PRESSURE: 132 MMHG | WEIGHT: 149.8 LBS | HEART RATE: 62 BPM | TEMPERATURE: 97 F | OXYGEN SATURATION: 92 % | BODY MASS INDEX: 22.19 KG/M2

## 2023-02-22 DIAGNOSIS — M79.642 PAIN OF LEFT HAND: ICD-10-CM

## 2023-02-22 DIAGNOSIS — S62.335A: Primary | ICD-10-CM

## 2023-02-22 PROCEDURE — 73130 X-RAY EXAM OF HAND: CPT

## 2023-02-22 PROCEDURE — 99203 OFFICE O/P NEW LOW 30 MIN: CPT | Performed by: ORTHOPAEDIC SURGERY

## 2023-02-22 RX ORDER — CEPHALEXIN 500 MG/1
CAPSULE ORAL
COMMUNITY
Start: 2023-01-09

## 2023-02-22 RX ORDER — OXYCODONE AND ACETAMINOPHEN 5; 325 MG/1; MG/1
TABLET ORAL
COMMUNITY
Start: 2023-01-21
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
## 2023-02-22 NOTE — LETTER
2/22/2023 8:45 AM    Mr. Lenka Cook  [address] [facility] [address]      To whom it may concern,         Lenka Cook is under the care of [address] [facility]. Lenka Cook will be released to work 2/23/23 with the following restrictions: 20 lbs weight bearing left arm. If you have any questions or concerns, please feel free to contact my office.                 Sincerely,      Mario Alberto Merritt, DO

## 2023-02-22 NOTE — PROGRESS NOTES
Identified pt with two pt identifiers (name and ). Reviewed chart in preparation for visit and have obtained necessary documentation. Ashlie Antonio is a 29 y.o. male  Chief Complaint   Patient presents with    Fracture     Lt Hand     Visit Vitals  /73 (BP 1 Location: Right upper arm, BP Patient Position: Sitting, BP Cuff Size: Large adult)   Pulse 62   Temp 97 °F (36.1 °C) (Temporal)   Ht 5' 9\" (1.753 m)   Wt 149 lb 12.8 oz (67.9 kg)   SpO2 92%   BMI 22.12 kg/m²     1. Have you been to the ER, urgent care clinic since your last visit? Hospitalized since your last visit? Yes Where: [facility]    2. Have you seen or consulted any other health care providers outside of the [address] [place] [address] since your last visit? Include any pap smears or colon screening.  No

## 2023-03-29 ENCOUNTER — OFFICE VISIT (OUTPATIENT)
Dept: ORTHOPEDIC SURGERY | Age: 33
End: 2023-03-29
Payer: MEDICAID

## 2023-03-29 VITALS
WEIGHT: 150 LBS | TEMPERATURE: 97.6 F | RESPIRATION RATE: 16 BRPM | OXYGEN SATURATION: 100 % | SYSTOLIC BLOOD PRESSURE: 113 MMHG | HEART RATE: 73 BPM | HEIGHT: 69 IN | BODY MASS INDEX: 22.22 KG/M2 | DIASTOLIC BLOOD PRESSURE: 76 MMHG

## 2023-03-29 DIAGNOSIS — S62.335A: Primary | ICD-10-CM

## 2023-03-29 PROCEDURE — 99212 OFFICE O/P EST SF 10 MIN: CPT | Performed by: ORTHOPAEDIC SURGERY

## 2023-03-29 NOTE — LETTER
3/29/2023 8:06 AM    Mr. Elfego Layton  [address] [facility] [address]      To whom it may concern,         Elfego Layton is under the care of [address] [facility]. He was seen today, 3/29/23. Elfego Layton will be released to work on 4/5/23 with the following restrictions: none. If you have any questions or concerns, please feel free to contact my office.                 Sincerely,      Flakito Del Castillo, DO

## 2023-03-29 NOTE — PROGRESS NOTES
3/29/2023      CC: left hand fourth metacarpal fracture    HPI:      This is a 34y.o. year old male who presents for a follow up visit. The patient was last seen and diagnosed with left hand fourth metacarpal neck fracture. The patient's treatments since the most recent visit have comprised of home PT, work restrictions. The patient has had good relief of the chief complaint. PMH:  History reviewed. No pertinent past medical history. PSxHx:  History reviewed. No pertinent surgical history. Meds:    Current Outpatient Medications:     oxyCODONE-acetaminophen (PERCOCET) 5-325 mg per tablet, TAKE 1 TABLET BY MOUTH EVERY 6 HOURS FOR 3 DAYS AS NEEDED FOR PAIN, Disp: , Rfl:     cephALEXin (KEFLEX) 500 mg capsule, TAKE 1 CAPSULE BY MOUTH FOUR TIMES DAILY, Disp: , Rfl:     loratadine-pseudoephedrine (Claritin-D 24 Hour)  mg per tablet, Take 1 Tablet by mouth daily. , Disp: 14 Tablet, Rfl: 0    ibuprofen (MOTRIN) 600 mg tablet, Take 1 Tablet by mouth every eight (8) hours as needed for Pain., Disp: 30 Tablet, Rfl: 0    All:  No Known Allergies    Social Hx:  Social History     Socioeconomic History    Marital status: SINGLE   Tobacco Use    Smoking status: Never    Smokeless tobacco: Never   Substance and Sexual Activity    Alcohol use: Yes    Drug use: Yes     Types: Marijuana       Family Hx:  History reviewed. No pertinent family history.       Review of Systems:       General: Denies headache, lethargy, fever, weight loss  Ears/Nose/Throat: Denies ear discharge, drainage, nosebleeds, hoarse voice, dental problems  Cardiovascular: Denies chest pain, shortness of breath  Lungs: Denies chest pain, breathing problems, wheezing, pneumonia  Stomach: Denies stomach pain, heartburn, constipation, irritable bowel  Skin: Denies rash, sores, open wounds  Musculoskeletal: resolving hand pain  Genitourinary: Denies dysuria, hematuria, polyuria  Gastrointestinal: Denies constipation, obstipation, diarrhea  Neurological: Denies changes in sight, smell, hearing, taste, seizures. Denies loss of consciousness. Psychiatric: Denies depression, sleep pattern changes, anxiety, change in personality  Endocrine: Denies mood swings, heat or cold intolerance  Hematologic/Lymphatic: Denies anemia, purpura, petechia  Allergic/Immunologic: Denies swelling of throat, pain or swelling at lymph nodes      Physical Examination:    Visit Vitals  Ht 5' 9\" (1.753 m)   Wt 150 lb (68 kg)   BMI 22.15 kg/m²        General: AOX3, no apparent distress  Psychiatric: mood and affect appropriate  Lungs: breathing is symmetric and unlabored bilaterally  Heart: regular rate and rhythm  Abdomen: no guarding  Head: normocephalic, atraumatic  Skin: No significant abnormalities, good turgor  Sensation intact to light touch: C5-T1 dermatomes  Muscular exam: 5/5 strength in all major muscle groups unless noted in specialty exam.    Extremities        Left upper extremity:  Minimal loss of knuckle appearance with  position. No crossover of fingers with finger flexion or extension. No restriction to range of motion of the hip, knee, ankle. Muscle bulk is appropriate without wasting. Sensation is intact to light touch in the L1-S1 dermatomes. Capillary refill is less than 2 seconds in the fingers. Strength testing is 5/5 at the major muscle groups of the hip, knee, ankle. Right upper extremity: No gross deformity. No restriction to range of motion of the hip, knee, ankle. Muscle bulk is appropriate without wasting. Sensation is intact to light touch in the L1-S1 dermatomes. Capillary refill is less than 2 seconds in the fingers. Strength testing is 5/5 at the major muscle groups of the hip, knee, ankle. Diagnostics:    Pertinent Diagnostics: **    Assessment: left hand fourth metacarpal neck fracture  Plan:     This patient will continue to weight bear to tolerance, no hand restrictions at this point, and he will proceed with return to work. He has been educated on the role of time and effort in range of motion to help resolve his residual symptoms. Follow up with me prn. Mr. Sharla Angel has a reminder for a \"due or due soon\" health maintenance. I have asked that he contact his primary care provider for follow-up on this health maintenance.

## 2023-03-29 NOTE — PROGRESS NOTES
Identified pt with two pt identifiers (name and ). Reviewed chart in preparation for visit and have obtained necessary documentation. Johnny Barron is a 34 y.o. male  Chief Complaint   Patient presents with    Hand Pain     LT Hand follow up     Visit Vitals  /76 (BP 1 Location: Right arm, BP Patient Position: Sitting, BP Cuff Size: Large adult)   Pulse 73   Temp 97.6 °F (36.4 °C) (Tympanic)   Resp 16   Ht 5' 9\" (1.753 m)   Wt 150 lb (68 kg)   SpO2 100%   BMI 22.15 kg/m²     1. Have you been to the ER, urgent care clinic since your last visit? Hospitalized since your last visit? No    2. Have you seen or consulted any other health care providers outside of the [address] [place] [address] since your last visit? Include any pap smears or colon screening.  No

## 2023-07-23 ENCOUNTER — HOSPITAL ENCOUNTER (EMERGENCY)
Facility: HOSPITAL | Age: 33
Discharge: HOME | End: 2023-07-23
Payer: MEDICAID

## 2023-07-23 VITALS
RESPIRATION RATE: 18 BRPM | DIASTOLIC BLOOD PRESSURE: 70 MMHG | HEART RATE: 73 BPM | OXYGEN SATURATION: 97 % | WEIGHT: 139.77 LBS | SYSTOLIC BLOOD PRESSURE: 123 MMHG | TEMPERATURE: 97.8 F | BODY MASS INDEX: 20.64 KG/M2

## 2023-07-23 DIAGNOSIS — J02.9 ACUTE PHARYNGITIS, UNSPECIFIED ETIOLOGY: Primary | ICD-10-CM

## 2023-07-23 DIAGNOSIS — J02.0 STREP PHARYNGITIS: ICD-10-CM

## 2023-07-23 LAB
DEPRECATED S PYO AG THROAT QL EIA: NEGATIVE
FLUAV AG NPH QL IA: NEGATIVE
FLUBV AG NOSE QL IA: NEGATIVE
SARS-COV-2 RDRP RESP QL NAA+PROBE: NOT DETECTED
SOURCE: NORMAL

## 2023-07-23 PROCEDURE — 87804 INFLUENZA ASSAY W/OPTIC: CPT

## 2023-07-23 PROCEDURE — 87147 CULTURE TYPE IMMUNOLOGIC: CPT

## 2023-07-23 PROCEDURE — 87635 SARS-COV-2 COVID-19 AMP PRB: CPT

## 2023-07-23 PROCEDURE — 99283 EMERGENCY DEPT VISIT LOW MDM: CPT

## 2023-07-23 PROCEDURE — 6370000000 HC RX 637 (ALT 250 FOR IP)

## 2023-07-23 PROCEDURE — 87070 CULTURE OTHR SPECIMN AEROBIC: CPT

## 2023-07-23 PROCEDURE — 87880 STREP A ASSAY W/OPTIC: CPT

## 2023-07-23 RX ORDER — ACETAMINOPHEN 500 MG
1000 TABLET ORAL
Status: COMPLETED | OUTPATIENT
Start: 2023-07-23 | End: 2023-07-23

## 2023-07-23 RX ADMIN — ACETAMINOPHEN 1000 MG: 500 TABLET ORAL at 10:07

## 2023-07-23 RX ADMIN — Medication 1 SPRAY: at 11:28

## 2023-07-23 ASSESSMENT — PAIN - FUNCTIONAL ASSESSMENT: PAIN_FUNCTIONAL_ASSESSMENT: NONE - DENIES PAIN

## 2023-07-23 NOTE — ED PROVIDER NOTES
COVID and flu negative. Low suspicion for PTA or deep space neck infection given normal pharyngeal exam.  Suspect likely viral pharyngitis. Advised treatment with over-the-counter supportive measures including Tylenol, ibuprofen, Chloraseptic spray, lozenges. Discussed we will follow-up with results of strep culture and treat with antibiotics if needed. Advised follow-up with primary care or return to ED for any new or worsening symptoms or deterioration, including inability to tolerate p.o., throat swelling, fever. FINAL IMPRESSION     1. Acute pharyngitis, unspecified etiology          DISPOSITION/PLAN   DISPOSITION Decision To Discharge 07/23/2023 11:44:32 AM      Discharge Note: The patient is stable for discharge home. The signs, symptoms, diagnosis, and discharge instructions have been discussed, understanding conveyed, and agreed upon. The patient is to follow up as recommended or return to ER should their symptoms worsen. PATIENT REFERRED TO:  [facility]  [address] [place] [address]  [phone]  Schedule an appointment as soon as possible for a visit       [facility]  [address] [place] [address] [address]  [phone]    As needed, If symptoms worsen       DISCHARGE MEDICATIONS:     Medication List        ASK your doctor about these medications      cephALEXin 500 MG capsule  Commonly known as: KEFLEX     Claritin-D 24 Hour  MG per extended release tablet  Generic drug: loratadine-pseudoephedrine     ibuprofen 600 MG tablet  Commonly known as: ADVIL;MOTRIN     oxyCODONE-acetaminophen 5-325 MG per tablet  Commonly known as: PERCOCET                DISCONTINUED MEDICATIONS:  Discharge Medication List as of 7/23/2023 11:41 AM          I have seen and evaluated the patient autonomously. My supervision physician was on site and available for consultation if needed. I am the Primary Clinician of Record.    Rochelle Steiner

## 2023-07-26 LAB
BACTERIA SPEC CULT: ABNORMAL
BACTERIA SPEC CULT: ABNORMAL
SERVICE CMNT-IMP: ABNORMAL

## 2023-07-26 RX ORDER — PENICILLIN V POTASSIUM 500 MG/1
500 TABLET ORAL 2 TIMES DAILY
Qty: 20 TABLET | Refills: 0 | Status: SHIPPED | OUTPATIENT
Start: 2023-07-26 | End: 2023-08-05

## 2023-10-03 ENCOUNTER — APPOINTMENT (OUTPATIENT)
Facility: HOSPITAL | Age: 33
End: 2023-10-03
Payer: MEDICAID

## 2023-10-03 ENCOUNTER — TELEPHONE (OUTPATIENT)
Age: 33
End: 2023-10-03

## 2023-10-03 ENCOUNTER — HOSPITAL ENCOUNTER (EMERGENCY)
Facility: HOSPITAL | Age: 33
Discharge: HOME | End: 2023-10-03
Payer: MEDICAID

## 2023-10-03 VITALS
BODY MASS INDEX: 21.56 KG/M2 | WEIGHT: 146 LBS | HEART RATE: 88 BPM | DIASTOLIC BLOOD PRESSURE: 69 MMHG | SYSTOLIC BLOOD PRESSURE: 131 MMHG | TEMPERATURE: 98.6 F | RESPIRATION RATE: 18 BRPM | OXYGEN SATURATION: 96 %

## 2023-10-03 DIAGNOSIS — N50.82 SCROTAL PAIN: Primary | ICD-10-CM

## 2023-10-03 PROCEDURE — 76870 US EXAM SCROTUM: CPT

## 2023-10-03 PROCEDURE — 99284 EMERGENCY DEPT VISIT MOD MDM: CPT

## 2023-10-03 ASSESSMENT — PAIN DESCRIPTION - LOCATION: LOCATION: SCROTUM

## 2023-10-03 ASSESSMENT — PAIN - FUNCTIONAL ASSESSMENT: PAIN_FUNCTIONAL_ASSESSMENT: 0-10

## 2023-10-03 ASSESSMENT — PAIN DESCRIPTION - ORIENTATION: ORIENTATION: RIGHT

## 2023-10-03 ASSESSMENT — PAIN DESCRIPTION - PAIN TYPE: TYPE: ACUTE PAIN

## 2023-10-03 ASSESSMENT — PAIN DESCRIPTION - FREQUENCY: FREQUENCY: INTERMITTENT

## 2023-10-03 ASSESSMENT — PAIN SCALES - GENERAL: PAINLEVEL_OUTOF10: 5

## 2023-10-03 NOTE — TELEPHONE ENCOUNTER
----- Message from Ahmet Barakat sent at 10/3/2023 11:06 AM EDT -----  Subject: Appointment Request    Reason for Call: New Patient/New to Provider Appointment needed: Routine   Existing Condition Follow Up    QUESTIONS    Reason for appointment request? No appointments available during search     Additional Information for Provider? pt trying to get est in this office,   may have possible hernia in groin  ---------------------------------------------------------------------------  --------------  Anabelle MYERS  [phone]; OK to leave message on voicemail  ---------------------------------------------------------------------------  --------------  SCRIPT ANSWERS

## 2023-10-04 NOTE — ED NOTES
Patient discharged from the ED by Benjamin Fam RN. Diagnosis, medications, precautions and follow-ups were reviewed with the patient/family. Questions were asked and answered prior to departure. Patient departed the ED via walk-out and was accompanied by friend.          Debra Cabral RN  10/03/23 [id number]

## 2023-10-04 NOTE — ED NOTES
JAKE Adkins at bedside for assessment with this RN and Mauricio Wilkins as chaperones.       Jose Carlos Stinson RN  10/03/23 2120

## 2023-10-04 NOTE — ED PROVIDER NOTES
[facility] EMERGENCY DEPT  EMERGENCY DEPARTMENT ENCOUNTER       Pt Name: Chikis Silva  MRN: [id number]  [address] [facility] 1990  Date of evaluation: 10/3/2023  Provider: JAKE Baeza   PCP: None, None  Note Started: 9:24 PM EDT 10/3/23     CHIEF COMPLAINT       Chief Complaint   Patient presents with    Groin Pain     From home into triage. Pt helped his cousin move on Saturday, felt a strain in groin that went away the next day. Pain came back yesterday with right testicle \"feeling lower with an extra ball there\". Says it's 5/10 but more tender than painful. HISTORY OF PRESENT ILLNESS: 1 or more elements      History From: Patient  HPI Limitations: None     Chikis Silva is a 34 y.o. male who presents by POV with complaints of pain to the right testicle. This past Saturday he was helping a cousin move. He was pulling on a rich and felt something pull in his testicle. Since that time he has felt a knot. He denies any abdominal pain, back pain, fever, chills, dysuria, penile drainage, or constipation at this time. Nursing Notes were all reviewed and agreed with or any disagreements were addressed in the HPI. REVIEW OF SYSTEMS      Review of Systems     Positives and Pertinent negatives as per HPI. PAST HISTORY     Past Medical History:  No past medical history on file. Past Surgical History:  No past surgical history on file. Family History:  No family history on file.     Social History:  Social History     Tobacco Use    Smoking status: Never    Smokeless tobacco: Never   Substance Use Topics    Alcohol use: Yes    Drug use: Yes     Types: Marijuana (Weed)       Allergies:  No Known Allergies    CURRENT MEDICATIONS      Previous Medications    IBUPROFEN (ADVIL;MOTRIN) 600 MG TABLET    Take 600 mg by mouth every 8 hours as needed    LORATADINE-PSEUDOEPHEDRINE (CLARITIN-D 24 HOUR)  MG PER EXTENDED RELEASE TABLET    Take 1 tablet by mouth daily    OXYCODONE-ACETAMINOPHEN (PERCOCET) 5-325 MG

## 2023-10-04 NOTE — DISCHARGE INSTRUCTIONS
Thank You! It was a pleasure taking care of you in our Emergency Department today. We know that when you come to our Emergency Department, you are entrusting us with your health, comfort, and safety. Our physicians and nurses honor that trust, and truly appreciate the opportunity to care for you and your loved ones. We also value your feedback. If you receive a survey about your Emergency Department experience today, please fill it out. We care about our patients' feedback, and we listen to what you have to say. Thank you. Jana Bush PA-C      ________________________________________________________________________  I have included a copy of your lab results and/or radiologic studies from today's visit so you can have them easily available at your follow-up visit. We hope you feel better and please do not hesitate to contact the ED if you have any questions at all! No results found for this or any previous visit (from the past 12 hour(s)). US SCROTUM AND TESTICLES   Final Result      1. No acute abnormality. 2. Bilateral testicular microlithiasis. The exam and treatment you received in the Emergency Department were for an urgent problem and are not intended as complete care. It is important that you follow up with a doctor, nurse practitioner, or physician assistant for ongoing care. If your symptoms become worse or you do not improve as expected and you are unable to reach your usual health care provider, you should return to the Emergency Department. We are available 24 hours a day. Please take your discharge instructions with you when you go to your follow-up appointment. If a prescription has been provided, please have it filled as soon as possible to prevent a delay in treatment. Read the entire medication instruction sheet provided to you by the pharmacy.  If you have any questions or reservations about taking the medication due to side effects or interactions with

## 2024-04-04 ENCOUNTER — APPOINTMENT (OUTPATIENT)
Facility: HOSPITAL | Age: 34
End: 2024-04-04
Payer: MEDICAID

## 2024-04-04 ENCOUNTER — HOSPITAL ENCOUNTER (EMERGENCY)
Facility: HOSPITAL | Age: 34
Discharge: HOME | End: 2024-04-04
Payer: MEDICAID

## 2024-04-04 VITALS
OXYGEN SATURATION: 95 % | BODY MASS INDEX: 23.62 KG/M2 | HEIGHT: 68 IN | SYSTOLIC BLOOD PRESSURE: 108 MMHG | TEMPERATURE: 99.6 F | WEIGHT: 155.87 LBS | RESPIRATION RATE: 20 BRPM | HEART RATE: 95 BPM | DIASTOLIC BLOOD PRESSURE: 79 MMHG

## 2024-04-04 DIAGNOSIS — J10.1 INFLUENZA B: Primary | ICD-10-CM

## 2024-04-04 LAB
DEPRECATED S PYO AG THROAT QL EIA: NEGATIVE
FLUAV AG NPH QL IA: NEGATIVE
FLUBV AG NOSE QL IA: POSITIVE
SARS-COV-2 RDRP RESP QL NAA+PROBE: NOT DETECTED
SOURCE: NORMAL

## 2024-04-04 PROCEDURE — 87880 STREP A ASSAY W/OPTIC: CPT

## 2024-04-04 PROCEDURE — 6370000000 HC RX 637 (ALT 250 FOR IP)

## 2024-04-04 PROCEDURE — 71046 X-RAY EXAM CHEST 2 VIEWS: CPT

## 2024-04-04 PROCEDURE — 87070 CULTURE OTHR SPECIMN AEROBIC: CPT

## 2024-04-04 PROCEDURE — 99284 EMERGENCY DEPT VISIT MOD MDM: CPT

## 2024-04-04 PROCEDURE — 87804 INFLUENZA ASSAY W/OPTIC: CPT

## 2024-04-04 PROCEDURE — 87635 SARS-COV-2 COVID-19 AMP PRB: CPT

## 2024-04-04 RX ORDER — ACETAMINOPHEN 500 MG
1000 TABLET ORAL
Status: COMPLETED | OUTPATIENT
Start: 2024-04-04 | End: 2024-04-04

## 2024-04-04 RX ORDER — IBUPROFEN 600 MG/1
600 TABLET ORAL EVERY 8 HOURS PRN
Qty: 20 TABLET | Refills: 0 | Status: SHIPPED | OUTPATIENT
Start: 2024-04-04

## 2024-04-04 RX ORDER — GUAIFENESIN/DEXTROMETHORPHAN 100-10MG/5
5 SYRUP ORAL 3 TIMES DAILY PRN
Qty: 120 ML | Refills: 0 | Status: SHIPPED | OUTPATIENT
Start: 2024-04-04 | End: 2024-04-14

## 2024-04-04 RX ORDER — ACETAMINOPHEN 500 MG
1000 TABLET ORAL EVERY 6 HOURS PRN
Qty: 30 TABLET | Refills: 0 | Status: SHIPPED | OUTPATIENT
Start: 2024-04-04

## 2024-04-04 RX ORDER — FLUTICASONE PROPIONATE 50 MCG
2 SPRAY, SUSPENSION (ML) NASAL DAILY
Qty: 16 G | Refills: 0 | Status: SHIPPED | OUTPATIENT
Start: 2024-04-04

## 2024-04-04 RX ORDER — ONDANSETRON 4 MG/1
4 TABLET, ORALLY DISINTEGRATING ORAL EVERY 8 HOURS PRN
Qty: 10 TABLET | Refills: 0 | Status: SHIPPED | OUTPATIENT
Start: 2024-04-04

## 2024-04-04 RX ADMIN — ACETAMINOPHEN 1000 MG: 500 TABLET ORAL at 22:59

## 2024-04-04 ASSESSMENT — PAIN - FUNCTIONAL ASSESSMENT: PAIN_FUNCTIONAL_ASSESSMENT: NONE - DENIES PAIN

## 2024-04-04 ASSESSMENT — LIFESTYLE VARIABLES
HOW MANY STANDARD DRINKS CONTAINING ALCOHOL DO YOU HAVE ON A TYPICAL DAY: 1 OR 2
HOW OFTEN DO YOU HAVE A DRINK CONTAINING ALCOHOL: MONTHLY OR LESS

## 2024-04-05 NOTE — ED NOTES
D/c paperwork, education, all questions answered by JAKE Woody. This RN did not see Pt depart. JAKE Woody reports he was ambulatory with brisk gait out of ED.   Glycopyrrolate Pregnancy And Lactation Text: This medication is Pregnancy Category B and is considered safe during pregnancy. It is unknown if it is excreted breast milk.

## 2024-04-05 NOTE — ED PROVIDER NOTES
[facility] EMERGENCY DEPT  EMERGENCY DEPARTMENT ENCOUNTER       Pt Name: Saúl Lara  MRN: [id number]  Birthdate 1990  Date of evaluation: 4/4/2024  Provider: Ramya Woody PA-C   PCP: None, None  Note Started: 11:16 PM EDT 4/4/24     CHIEF COMPLAINT       Chief Complaint   Patient presents with    Illness     Pt arrives for flu-like s/sx denies vaccination for flu         HISTORY OF PRESENT ILLNESS: 1 or more elements      History From: Patient  HPI Limitations: None     Saúl Lara is a 34 y.o. male who presents complaining of bodyaches, chills, fevers, cough, nasal congestion, chest congestion, headaches x 4 days.  Patient reports his symptoms started on Monday.  He reports that his son also has similar symptoms.  He notes he has a cough with yellowish-green mucus.  He reports he has been taking NyQuil, ibuprofen for his symptoms.  He denies any chest pain, shortness of breath, hemoptysis.  He denies any abdominal pain, nausea, vomiting, bowel/bladder changes.  Denies dysuria, hematuria, urinary frequency.     Nursing Notes were all reviewed and agreed with or any disagreements were addressed in the HPI.     REVIEW OF SYSTEMS      Review of Systems     Positives and Pertinent negatives as per HPI.    PAST HISTORY     Past Medical History:  No past medical history on file.    Past Surgical History:  No past surgical history on file.    Family History:  No family history on file.    Social History:  Social History     Tobacco Use    Smoking status: Never    Smokeless tobacco: Never   Substance Use Topics    Alcohol use: Yes    Drug use: Yes     Types: Marijuana (Weed)       Allergies:  No Known Allergies    CURRENT MEDICATIONS      Discharge Medication List as of 4/4/2024 11:42 PM        CONTINUE these medications which have NOT CHANGED    Details   loratadine-pseudoephedrine (CLARITIN-D 24 HOUR)  MG per extended release tablet Take 1 tablet by mouth dailyHistorical Med      oxyCODONE-acetaminophen (PERCOCET)

## 2024-04-07 LAB
BACTERIA SPEC CULT: NORMAL
SERVICE CMNT-IMP: NORMAL